# Patient Record
Sex: FEMALE | Race: WHITE | NOT HISPANIC OR LATINO | Employment: UNEMPLOYED | URBAN - METROPOLITAN AREA
[De-identification: names, ages, dates, MRNs, and addresses within clinical notes are randomized per-mention and may not be internally consistent; named-entity substitution may affect disease eponyms.]

---

## 2023-11-01 ENCOUNTER — APPOINTMENT (OUTPATIENT)
Dept: RADIOLOGY | Facility: HOSPITAL | Age: 33
End: 2023-11-01
Payer: COMMERCIAL

## 2023-11-01 ENCOUNTER — HOSPITAL ENCOUNTER (EMERGENCY)
Facility: HOSPITAL | Age: 33
Discharge: HOME | End: 2023-11-01
Payer: COMMERCIAL

## 2023-11-01 VITALS
SYSTOLIC BLOOD PRESSURE: 125 MMHG | DIASTOLIC BLOOD PRESSURE: 54 MMHG | RESPIRATION RATE: 17 BRPM | HEART RATE: 77 BPM | WEIGHT: 199.96 LBS | BODY MASS INDEX: 29.62 KG/M2 | TEMPERATURE: 98.4 F | HEIGHT: 69 IN

## 2023-11-01 DIAGNOSIS — M79.671 RIGHT FOOT PAIN: Primary | ICD-10-CM

## 2023-11-01 PROCEDURE — 73630 X-RAY EXAM OF FOOT: CPT | Mod: RT,FY,FR

## 2023-11-01 PROCEDURE — 73630 X-RAY EXAM OF FOOT: CPT | Mod: RIGHT SIDE | Performed by: RADIOLOGY

## 2023-11-01 PROCEDURE — 73610 X-RAY EXAM OF ANKLE: CPT | Mod: RT,FY

## 2023-11-01 PROCEDURE — 99284 EMERGENCY DEPT VISIT MOD MDM: CPT | Mod: 25

## 2023-11-01 PROCEDURE — 73610 X-RAY EXAM OF ANKLE: CPT | Mod: RIGHT SIDE | Performed by: RADIOLOGY

## 2023-11-01 ASSESSMENT — LIFESTYLE VARIABLES
REASON UNABLE TO ASSESS: NO
EVER FELT BAD OR GUILTY ABOUT YOUR DRINKING: NO
EVER HAD A DRINK FIRST THING IN THE MORNING TO STEADY YOUR NERVES TO GET RID OF A HANGOVER: NO
HAVE YOU EVER FELT YOU SHOULD CUT DOWN ON YOUR DRINKING: NO
HAVE PEOPLE ANNOYED YOU BY CRITICIZING YOUR DRINKING: NO

## 2023-11-01 ASSESSMENT — PAIN DESCRIPTION - LOCATION: LOCATION: FOOT

## 2023-11-01 ASSESSMENT — PAIN DESCRIPTION - PAIN TYPE: TYPE: ACUTE PAIN

## 2023-11-01 ASSESSMENT — PAIN SCALES - GENERAL: PAINLEVEL_OUTOF10: 7

## 2023-11-01 ASSESSMENT — COLUMBIA-SUICIDE SEVERITY RATING SCALE - C-SSRS
6. HAVE YOU EVER DONE ANYTHING, STARTED TO DO ANYTHING, OR PREPARED TO DO ANYTHING TO END YOUR LIFE?: NO
1. IN THE PAST MONTH, HAVE YOU WISHED YOU WERE DEAD OR WISHED YOU COULD GO TO SLEEP AND NOT WAKE UP?: NO
2. HAVE YOU ACTUALLY HAD ANY THOUGHTS OF KILLING YOURSELF?: NO

## 2023-11-01 ASSESSMENT — PAIN - FUNCTIONAL ASSESSMENT: PAIN_FUNCTIONAL_ASSESSMENT: 0-10

## 2023-11-01 ASSESSMENT — PAIN DESCRIPTION - ORIENTATION: ORIENTATION: RIGHT

## 2023-11-01 NOTE — ED PROVIDER NOTES
HPI   Chief Complaint   Patient presents with    Fall     Pt tripped on a rock and fell hurting her right foot       This is a 33-year-old female coming for right foot injury.  See MDM      History provided by:  Patient                      Mehnaz Coma Scale Score: 15                  Patient History   Past Medical History:   Diagnosis Date    Other specified health status 2021    No pertinent past medical history    Personal history of other endocrine, nutritional and metabolic disease 2017    History of hypothyroidism     Past Surgical History:   Procedure Laterality Date     SECTION, CLASSIC  2021     Section     No family history on file.  Social History     Tobacco Use    Smoking status: Never     Passive exposure: Never    Smokeless tobacco: Never   Substance Use Topics    Alcohol use: Yes    Drug use: Never       Physical Exam   ED Triage Vitals   Temp Heart Rate Resp BP   23 1737 23 1737 23 1737 23   37.1 °C (98.8 °F) 80 18 143/83      SpO2 Temp Source Heart Rate Source Patient Position   -- 23 1737 23 1737 23    Temporal Monitor Sitting      BP Location FiO2 (%)     23 1737 23     Left arm 96 %       Physical Exam  Vitals and nursing note reviewed.   Constitutional:       General: She is not in acute distress.     Appearance: Normal appearance. She is not ill-appearing or toxic-appearing.   HENT:      Head: Normocephalic and atraumatic.   Eyes:      Extraocular Movements: Extraocular movements intact.      Conjunctiva/sclera: Conjunctivae normal.      Pupils: Pupils are equal, round, and reactive to light.   Musculoskeletal:         General: Normal range of motion.      Cervical back: Normal range of motion and neck supple.   Feet:      Comments: There is tenderness to palpation to the lateral aspect of the right foot.  No swelling or ecchymosis.  Skin:     General: Skin is warm and dry.   Neurological:       General: No focal deficit present.      Mental Status: She is alert and oriented to person, place, and time.   Psychiatric:         Mood and Affect: Mood normal.         Behavior: Behavior normal.         Thought Content: Thought content normal.         ED Course & MDM   Diagnoses as of 11/01/23 1945   Right foot pain       Medical Decision Making  Summary:  Medical Decision Making:   Patient presented as described in HPI. Patient case including ROS, PE, and treatment and plan discussed with ED attending if attached as cosigner. Results from labs and or imaging included below if completed. Francia Villegas  is a 33 y.o. coming in for Patient presents with:  Fall: Pt tripped on a rock and fell hurting her right foot  .  Is a 33-year-old female coming in for right foot injury.  She states that she tripped on a rock injuring the right foot.  She felt a pop to the area and has had pain since.  She took some ibuprofen prior to arrival.  She states that has not helped much with her discomfort.  Patient has not fractured or injured this area before in the past.  On exam she does not have any swelling or ecchymosis to the right foot.  She has pain on palpation to the lateral aspect of the right foot.  Due to this imaging was completed of both the foot and the ankle to rule out avulsion injury or other abnormalities that could be present in the ankle as well.  Imaging shows no acute concerning abnormalities.  She was offered Toradol for further pain control however refused.  Patient will be given a walking boot and given orthopedic follow-up.    Patient was advised to follow up with PCP or recommended provider in 2-3 days for another evaluation and exam. I advised patient/guardian to return or go to closest emergency room immediately if symptoms change, get worse, new symptoms develop prior to follow up. If there is no improvement in symptoms in the next 24 hours they are advised to return for further evaluation and  exam. I also explained the plan and treatment course. Patient/guardian is in agreement with plan, treatment course, and follow up and states verbally that they will comply.    Labs Reviewed - No data to display   XR foot right 3+ views   Final Result    No acute bony abnormalities are identified in the right ankle or foot.    Signed by Moreno Farrar MD     XR ankle right 3+ views   Final Result    No acute bony abnormalities are identified in the right ankle or foot.    Signed by Moreno Farrar MD          Tests/Medications/Escalations of Care considered but not given: As in MDM    Patient care discussed with: N/A  Social Determinants affecting care: N/A    Final diagnosis and disposition as documented       Homegoing. I discussed the differential; results and discharge plan with the patient and/or family/friend/caregiver if present.  I emphasized the importance of follow-up with the physician I referred them to in the timeframe recommended.  I explained reasons for the patient to return to the Emergency Department. They agreed that if they feel their condition is worsening or if they have any other concern they should call 911 immediately for further assistance. I gave the patient an opportunity to ask all questions they had and answered all of them accordingly. They understand return precautions and discharge instructions. The patient and/or family/friend/caregiver expressed understanding verbally and that they would comply.     Disposition: Discharge      This note has been transcribed using voice recognition and may contain grammatical errors, misplaced words, incorrect words, incorrect phrases or other errors.          Procedure  Procedures     Rahat Ocampo PA-C  11/01/23 1947

## 2025-07-25 ENCOUNTER — HOSPITAL ENCOUNTER (EMERGENCY)
Facility: HOSPITAL | Age: 35
Discharge: HOME | End: 2025-07-25
Payer: COMMERCIAL

## 2025-07-25 ENCOUNTER — APPOINTMENT (OUTPATIENT)
Dept: RADIOLOGY | Facility: HOSPITAL | Age: 35
End: 2025-07-25
Payer: COMMERCIAL

## 2025-07-25 ENCOUNTER — PHARMACY VISIT (OUTPATIENT)
Dept: PHARMACY | Facility: CLINIC | Age: 35
End: 2025-07-25
Payer: MEDICARE

## 2025-07-25 VITALS
DIASTOLIC BLOOD PRESSURE: 78 MMHG | BODY MASS INDEX: 28.98 KG/M2 | SYSTOLIC BLOOD PRESSURE: 131 MMHG | OXYGEN SATURATION: 97 % | HEIGHT: 71 IN | RESPIRATION RATE: 20 BRPM | WEIGHT: 207 LBS | HEART RATE: 79 BPM | TEMPERATURE: 97.5 F

## 2025-07-25 DIAGNOSIS — S92.511A CLOSED DISPLACED FRACTURE OF PROXIMAL PHALANX OF LESSER TOE OF RIGHT FOOT, INITIAL ENCOUNTER: Primary | ICD-10-CM

## 2025-07-25 PROCEDURE — 99284 EMERGENCY DEPT VISIT MOD MDM: CPT

## 2025-07-25 PROCEDURE — 96372 THER/PROPH/DIAG INJ SC/IM: CPT

## 2025-07-25 PROCEDURE — 73660 X-RAY EXAM OF TOE(S): CPT | Mod: RIGHT SIDE | Performed by: RADIOLOGY

## 2025-07-25 PROCEDURE — 2500000004 HC RX 250 GENERAL PHARMACY W/ HCPCS (ALT 636 FOR OP/ED): Mod: JZ

## 2025-07-25 PROCEDURE — 73660 X-RAY EXAM OF TOE(S): CPT | Mod: RT

## 2025-07-25 PROCEDURE — RXMED WILLOW AMBULATORY MEDICATION CHARGE

## 2025-07-25 RX ORDER — OXYCODONE AND ACETAMINOPHEN 5; 325 MG/1; MG/1
1 TABLET ORAL EVERY 6 HOURS PRN
Qty: 5 TABLET | Refills: 0 | Status: SHIPPED | OUTPATIENT
Start: 2025-07-25 | End: 2025-07-28

## 2025-07-25 RX ORDER — KETOROLAC TROMETHAMINE 30 MG/ML
30 INJECTION, SOLUTION INTRAMUSCULAR; INTRAVENOUS ONCE
Status: COMPLETED | OUTPATIENT
Start: 2025-07-25 | End: 2025-07-25

## 2025-07-25 RX ADMIN — KETOROLAC TROMETHAMINE 30 MG: 30 INJECTION, SOLUTION INTRAMUSCULAR at 15:29

## 2025-07-25 ASSESSMENT — PAIN DESCRIPTION - ONSET: ONSET: ONGOING

## 2025-07-25 ASSESSMENT — PAIN DESCRIPTION - LOCATION: LOCATION: TOE (COMMENT WHICH ONE)

## 2025-07-25 ASSESSMENT — PAIN DESCRIPTION - ORIENTATION: ORIENTATION: RIGHT

## 2025-07-25 ASSESSMENT — PAIN DESCRIPTION - PAIN TYPE: TYPE: ACUTE PAIN

## 2025-07-25 ASSESSMENT — PAIN DESCRIPTION - FREQUENCY: FREQUENCY: CONSTANT/CONTINUOUS

## 2025-07-25 ASSESSMENT — PAIN DESCRIPTION - DESCRIPTORS: DESCRIPTORS: ACHING

## 2025-07-25 ASSESSMENT — PAIN SCALES - GENERAL: PAINLEVEL_OUTOF10: 10 - WORST POSSIBLE PAIN

## 2025-07-25 ASSESSMENT — PAIN - FUNCTIONAL ASSESSMENT: PAIN_FUNCTIONAL_ASSESSMENT: 0-10

## 2025-07-25 NOTE — ED PROVIDER NOTES
HPI   Chief Complaint   Patient presents with    Foot Injury       Patient is a 35-year-old female presenting to the ED for injury to her right foot times today.  Patient states she was barefoot in the grass when she tripped up her bike and bent her right fifth toe back.  Patient is unable to bear weight on the right foot.  Patient denies any numbness or tingling.  Patient has not had any pain medication for symptoms.  Patient denies any head injury.  Patient has no other complaints.              Patient History   Medical History[1]  Surgical History[2]  Family History[3]  Social History[4]    Physical Exam   ED Triage Vitals [07/25/25 1510]   Temperature Heart Rate Respirations BP   36.4 °C (97.5 °F) 79 20 131/78      Pulse Ox Temp Source Heart Rate Source Patient Position   97 % Temporal Monitor --      BP Location FiO2 (%)     -- --       Physical Exam    Cardiovascular:      Pulses: Normal pulses.   Pulmonary:      Effort: Pulmonary effort is normal.     Musculoskeletal:         General: Tenderness and deformity present.      Comments: Pain on palpation to the base of the right foot fifth digit with obvious deformity pain with attempt at ranging the toe     Skin:     Capillary Refill: Capillary refill takes less than 2 seconds.     Neurological:      Mental Status: She is alert.           ED Course & MDM   ED Course as of 07/25/25 1652 Fri Jul 25, 2025   1625 XR toe right 2+ views []      ED Course User Index  [] Jane Hanley PA-C         Diagnoses as of 07/25/25 1652   Closed displaced fracture of proximal phalanx of lesser toe of right foot, initial encounter                 No data recorded     Zearing Coma Scale Score: 15 (07/25/25 1511 : Justin Bhardwaj RN)                           Medical Decision Making  Medical Decision Making:  Patient presented as described in HPI. Patient case including ROS, PE, and treatment and plan discussed with ED attending if attached as cosigner. Due to patients  presentation orders completed include as documented.  Patient presents to the ED for right foot injury times today.  Patient has obvious deformity.  Patient is unable to range the fifth digit.  Patient given Toradol here.  Lidocaine was ordered to do a digital block.  Pending x-ray.  X-ray shows acute fracture at the fifth proximal phalanx.  Patient educated his findings.  Clyde tape placed and given crutches.  Educated follow-up with orthopedics patient follows mended soon.  Also ordered referral to the injury clinic.  Patient discharged home with pain medication educated any worsening symptoms to return patient remained stable on discharge.  Patient was advised to follow up with PCP or recommended provider in 2-3 days for another evaluation and exam. I advised patient/guardian to return or go to closest emergency room immediately if symptoms change, get worse, new symptoms develop prior to follow up. If there is no improvement in symptoms in the next 24 hours they are advised to return for further evaluation and exam. I also explained the plan and treatment course. Patient/guardian is in agreement with plan, treatment course, and follow up and states verbally that they will comply.        Patient care discussed with: N/A  Social Determinants affecting care: N/A    Final diagnosis and disposition as below.  See CI    Homegoing. I discussed the differential; results and discharge plan with the patient and/or family/friend/caregiver if present.  I emphasized the importance of follow-up with the physician I referred them to in the timeframe recommended.  I explained reasons for the patient to return to the Emergency Department. They agreed that if they feel their condition is worsening or if they have any other concern they should call 911 immediately for further assistance. I gave the patient an opportunity to ask all questions they had and answered all of them accordingly. They understand return precautions and  discharge instructions. The patient and/or family/friend/caregiver expressed understanding verbally and that they would comply.       Disposition:  Discharge        This note has been transcribed using voice recognition and may contain grammatical errors, misplaced words, incorrect words, incorrect phrases or other errors.        XR toe right 2+ views   Final Result   Acute fracture at the fifth proximal phalanx.   Signed by Ravinder Allen MD           Procedure  Procedures       [1]   Past Medical History:  Diagnosis Date    Other specified health status 2021    No pertinent past medical history    Personal history of other endocrine, nutritional and metabolic disease 2017    History of hypothyroidism   [2]   Past Surgical History:  Procedure Laterality Date     SECTION, CLASSIC  2021     Section   [3] No family history on file.  [4]   Social History  Tobacco Use    Smoking status: Never     Passive exposure: Never    Smokeless tobacco: Never   Substance Use Topics    Alcohol use: Yes    Drug use: Never        Jane Hanley PA-C  [phone]

## 2025-07-25 NOTE — ED TRIAGE NOTES
Patient was barefoot in her garage when she tripped over her kids bike and bent her right 5th toe back, patient has a deformity to the toe and is now unable to bear weight on her right foot.

## 2025-07-29 ENCOUNTER — APPOINTMENT (OUTPATIENT)
Dept: PRIMARY CARE | Facility: CLINIC | Age: 35
End: 2025-07-29

## 2025-07-29 VITALS
BODY MASS INDEX: 31.36 KG/M2 | OXYGEN SATURATION: 96 % | DIASTOLIC BLOOD PRESSURE: 74 MMHG | HEART RATE: 79 BPM | SYSTOLIC BLOOD PRESSURE: 126 MMHG | WEIGHT: 224 LBS | HEIGHT: 71 IN

## 2025-07-29 DIAGNOSIS — M25.522 ARTHRALGIA OF BOTH ELBOWS: ICD-10-CM

## 2025-07-29 DIAGNOSIS — Z13.1 SCREENING FOR DIABETES MELLITUS: ICD-10-CM

## 2025-07-29 DIAGNOSIS — T14.8XXA BROKEN BONES: ICD-10-CM

## 2025-07-29 DIAGNOSIS — R14.0 BLOATING: ICD-10-CM

## 2025-07-29 DIAGNOSIS — F41.9 ANXIETY: ICD-10-CM

## 2025-07-29 DIAGNOSIS — E55.9 VITAMIN D DEFICIENCY: ICD-10-CM

## 2025-07-29 DIAGNOSIS — L67.9 ABNORMALITY OF HAIR GROWTH: ICD-10-CM

## 2025-07-29 DIAGNOSIS — R53.83 OTHER FATIGUE: ICD-10-CM

## 2025-07-29 DIAGNOSIS — M25.521 ARTHRALGIA OF BOTH ELBOWS: ICD-10-CM

## 2025-07-29 DIAGNOSIS — Z00.00 HEALTH CARE MAINTENANCE: Primary | ICD-10-CM

## 2025-07-29 RX ORDER — ESCITALOPRAM OXALATE 10 MG/1
10 TABLET ORAL DAILY
Qty: 30 TABLET | Refills: 5 | Status: SHIPPED | OUTPATIENT
Start: 2025-07-29 | End: 2026-01-25

## 2025-07-29 ASSESSMENT — ENCOUNTER SYMPTOMS
DIARRHEA: 0
SINUS PRESSURE: 0
FEVER: 0
CHEST TIGHTNESS: 0
CHILLS: 0
ABDOMINAL PAIN: 0
SINUS PAIN: 0
DYSURIA: 0
SHORTNESS OF BREATH: 0
NAUSEA: 0
CONSTIPATION: 0
SORE THROAT: 0

## 2025-07-29 ASSESSMENT — PATIENT HEALTH QUESTIONNAIRE - PHQ9
2. FEELING DOWN, DEPRESSED OR HOPELESS: NOT AT ALL
SUM OF ALL RESPONSES TO PHQ9 QUESTIONS 1 AND 2: 0
1. LITTLE INTEREST OR PLEASURE IN DOING THINGS: NOT AT ALL

## 2025-07-29 NOTE — PROGRESS NOTES
Subjective   Patient ID: Francia Villegas is a 35 y.o. female who presents for Annual Exam (Patient is present in office for a CPE. Has been having joint pain, multiple broken bones over the last 3 years , weight gain the last 3 years, abnormal hair growth, acne, anxiety, horrible periods. ) and New Patient Visit.    HPI   Pt is here for annual wellness.  Reports overall health is good. She has had multiple broken bones in the last three years. Some in ankles and feet. Does complain of joint pain.   -she has noticed some weight gain, abnormal growth, acne, and horrible periods.     Do you take any herbs or supplements that were not prescribed by a doctor? Yes multivitmain  Are you taking calcium supplements? no  Are you taking aspirin daily? no  Colonoscopy: N/A  Fasting blood work: ordered  Last eye exam: yearly  Last dental Exam: every six months  Exercise: walking daily  Mood:pleasant.   Sleep: horrible she struggles with sleep.   Diet:  healthy tried fasting   Occupation:  N/A  Do you have pain that bothers you in your daily life? not asked    1. Patient Counseling:  --Nutrition: Stressed importance of moderation in sodium/caffeine intake, saturated fat and cholesterol, caloric balance, sufficient intake of fresh fruits, vegetables, fiber, calcium, iron, and 1 mg of folate supplement per day (for females capable of pregnancy).  --Discussed the issue of estrogen replacement, calcium supplement, and the daily use of baby aspirin as appropriate per pt.  --Exercise: Stressed the importance of regular exercise.   --Substance Abuse: Discussed cessation/primary prevention of tobacco, alcohol, or other drug use; driving or other dangerous activities under the influence; availability of treatment for abuse.    --Sexuality: Discussed sexually transmitted diseases, partner selection, use of condoms, avoidance of unintended pregnancy  and contraceptive alternatives.   --Injury prevention: Discussed safety belts, safety  "helmets, smoke detector, smoking near bedding or upholstery.   --Dental health: Discussed importance of regular tooth brushing, flossing, and dental visits.  --Immunizations reviewed.  --Discussed benefits of colon cancer screening.  --After hours service discussed with patient  2 Discussed the patient's BMI.  The BMI is above average. The patient received Provided instructions on dietary changes  Provided instructions on exercise  Advised to Increase physical activity because they have an above normal BMI.  3 Follow up in one year    Review of Systems   Constitutional:  Negative for chills and fever.   HENT:  Negative for ear pain, sinus pressure, sinus pain and sore throat.    Respiratory:  Negative for chest tightness and shortness of breath.    Cardiovascular:  Negative for chest pain.   Gastrointestinal:  Negative for abdominal pain, constipation, diarrhea and nausea.   Genitourinary:  Negative for dysuria.   Skin:  Negative for rash.   Neurological:  Negative for syncope.       Objective   /74   Pulse 79   Ht 1.803 m (5' 11\")   Wt 102 kg (224 lb)   SpO2 96%   BMI 31.24 kg/m²     Physical Exam  Vitals reviewed.   Constitutional:       Appearance: Normal appearance.     Cardiovascular:      Rate and Rhythm: Normal rate and regular rhythm.      Pulses: Normal pulses.      Heart sounds: Normal heart sounds.   Pulmonary:      Effort: Pulmonary effort is normal.      Breath sounds: Normal breath sounds.     Neurological:      General: No focal deficit present.      Mental Status: She is alert and oriented to person, place, and time.     Psychiatric:         Mood and Affect: Mood normal.         Behavior: Behavior normal.         Assessment/Plan   Diagnoses and all orders for this visit:  Health care maintenance  -     Lipid panel; Future  Other fatigue  -     CBC and Auto Differential; Future  -     Comprehensive metabolic panel; Future  -     Tsh With Reflex To Free T4 If Abnormal; Future  -     T3, " free; Future  -     Rheumatoid factor; Future  -     Ferritin; Future  -     Folate; Future  -     C-Reactive Protein; Future  -     Sedimentation Rate; Future  -     ELENA with Reflex to BENITO; Future  -     Vitamin B12; Future  Abnormality of hair growth  -     Insulin, random; Future  Arthralgia of both elbows  Anxiety  Comments:  escitalopram (Lexapro) 10 mg tablet ordered  Orders:  -     escitalopram (Lexapro) 10 mg tablet; Take 1 tablet (10 mg) by mouth once daily.  Screening for diabetes mellitus  -     Hemoglobin A1c; Future  Vitamin D deficiency  -     Vitamin D 25-Hydroxy,Total (for eval of Vitamin D levels); Future  Broken bones  Comments:  Dexa scan ordered  Orders:  -     XR DEXA bone density; Future  Bloating  -     [facility] MISCELLANEOUS TEST (ROOM TEMPERATURE); Future  -     Celiac Panel; Future  Other orders  -     Follow Up In Primary Care - Established; Future  -     [facility] FOOD AND TREE NUT ALLERGY W/REFL COMPONENTS  -     Allergen Interpretation

## 2025-07-30 LAB
25(OH)D3+25(OH)D2 SERPL-MCNC: 33 NG/ML (ref 30–100)
ALBUMIN SERPL-MCNC: 4.9 G/DL (ref 3.6–5.1)
ALMOND IGE QN: <0.1 KU/L
ALMOND IGE RAST: 0
ALP SERPL-CCNC: 45 U/L (ref 31–125)
ALT SERPL-CCNC: 13 U/L (ref 6–29)
ANA SER QL IF: NEGATIVE
ANION GAP SERPL CALCULATED.4IONS-SCNC: 12 MMOL/L (CALC) (ref 7–17)
AST SERPL-CCNC: 14 U/L (ref 10–30)
BASOPHILS # BLD AUTO: 36 CELLS/UL (ref 0–200)
BASOPHILS NFR BLD AUTO: 0.4 %
BILIRUB SERPL-MCNC: 0.4 MG/DL (ref 0.2–1.2)
BRAZIL NUT IGE QN: <0.1 KU/L
BRAZIL NUT IGE RAST: 0
BUN SERPL-MCNC: 13 MG/DL (ref 7–25)
CALCIUM SERPL-MCNC: 9.6 MG/DL (ref 8.6–10.2)
CASHEW NUT IGE QN: <0.1 KU/L
CASHEW NUT IGE RAST: 0
CHLORIDE SERPL-SCNC: 104 MMOL/L (ref 98–110)
CHOLEST SERPL-MCNC: 171 MG/DL
CHOLEST/HDLC SERPL: 4.4 (CALC)
CO2 SERPL-SCNC: 23 MMOL/L (ref 20–32)
CODFISH IGE QN: <0.1 KU/L
CODFISH IGE RAST: 0
CREAT SERPL-MCNC: 0.72 MG/DL (ref 0.5–0.97)
CRP SERPL-MCNC: <3 MG/L
EGFRCR SERPLBLD CKD-EPI 2021: 112 ML/MIN/1.73M2
EGG WHITE IGE QN: <0.1 KU/L
EGG WHITE IGE RAST: 0
EOSINOPHIL # BLD AUTO: 63 CELLS/UL (ref 15–500)
EOSINOPHIL NFR BLD AUTO: 0.7 %
ERYTHROCYTE [DISTWIDTH] IN BLOOD BY AUTOMATED COUNT: 13.3 % (ref 11–15)
ERYTHROCYTE [SEDIMENTATION RATE] IN BLOOD BY WESTERGREN METHOD: 6 MM/H
EST. AVERAGE GLUCOSE BLD GHB EST-MCNC: 114 MG/DL
EST. AVERAGE GLUCOSE BLD GHB EST-SCNC: 6.3 MMOL/L
FERRITIN SERPL-MCNC: 52 NG/ML (ref 16–154)
FOLATE SERPL-MCNC: 10.7 NG/ML
GLIADIN IGA SER IA-ACNC: 1.8 U/ML
GLIADIN IGG SER IA-ACNC: 8.7 U/ML
GLUCOSE SERPL-MCNC: 85 MG/DL (ref 65–139)
HAZELNUT IGE QN: <0.1 KU/L
HAZELNUT IGE RAST: 0
HBA1C MFR BLD: 5.6 %
HCT VFR BLD AUTO: 41.6 % (ref 35–45)
HDLC SERPL-MCNC: 39 MG/DL
HGB BLD-MCNC: 13.4 G/DL (ref 11.7–15.5)
IGA SERPL-MCNC: 171 MG/DL (ref 47–310)
LDLC SERPL CALC-MCNC: 101 MG/DL (CALC)
LYMPHOCYTES # BLD AUTO: 1944 CELLS/UL (ref 850–3900)
LYMPHOCYTES NFR BLD AUTO: 21.6 %
MACADAMIA IGE QN: <0.1 KU/L
MACADAMIA IGE RAST: 0
MCH RBC QN AUTO: 29.8 PG (ref 27–33)
MCHC RBC AUTO-ENTMCNC: 32.2 G/DL (ref 32–36)
MCV RBC AUTO: 92.4 FL (ref 80–100)
MILK IGE QN: <0.1 KU/L
MILK IGE RAST: 0
MONOCYTES # BLD AUTO: 468 CELLS/UL (ref 200–950)
MONOCYTES NFR BLD AUTO: 5.2 %
NEUTROPHILS # BLD AUTO: 6489 CELLS/UL (ref 1500–7800)
NEUTROPHILS NFR BLD AUTO: 72.1 %
NONHDLC SERPL-MCNC: 132 MG/DL (CALC)
PEANUT IGE QN: <0.1 KU/L
PEANUT IGE RAST: 0
PLATELET # BLD AUTO: 317 THOUSAND/UL (ref 140–400)
PMV BLD REES-ECKER: 11.7 FL (ref 7.5–12.5)
POTASSIUM SERPL-SCNC: 4.5 MMOL/L (ref 3.5–5.3)
PROT SERPL-MCNC: 7.4 G/DL (ref 6.1–8.1)
RBC # BLD AUTO: 4.5 MILLION/UL (ref 3.8–5.1)
REF LAB TEST REF RANGE: NORMAL
RHEUMATOID FACT SERPL-ACNC: <10 IU/ML
SALMON IGE QN: <0.1 KU/L
SALMON IGE RAST: 0
SCALLOP IGE QN: <0.1 KU/L
SCALLOP IGE RAST: 0
SESAME SEED IGE QN: <0.1 KU/L
SESAME SEED IGE RAST: 0
SHRIMP IGE QN: <0.1 KU/L
SHRIMP IGE RAST: 0
SODIUM SERPL-SCNC: 139 MMOL/L (ref 135–146)
SOYBEAN IGE QN: <0.1 KU/L
SOYBEAN IGE RAST: 0
T3FREE SERPL-MCNC: 2.8 PG/ML (ref 2.3–4.2)
TRIGL SERPL-MCNC: 195 MG/DL
TSH SERPL-ACNC: 2.17 MIU/L
TTG IGA SER-ACNC: <1 U/ML
TTG IGG SER-ACNC: <1 U/ML
TUNA IGE QN: <0.1 KU/L
TUNA IGE RAST: 0
VIT B12 SERPL-MCNC: 236 PG/ML (ref 200–1100)
WALNUT IGE QN: <0.1 KU/L
WALNUT IGE RAST: 0
WBC # BLD AUTO: 9 THOUSAND/UL (ref 3.8–10.8)
WHEAT IGE QN: <0.1 KU/L
WHEAT IGE RAST: 0

## 2025-08-06 ENCOUNTER — HOSPITAL ENCOUNTER (OUTPATIENT)
Dept: RADIOLOGY | Facility: HOSPITAL | Age: 35
Discharge: HOME | End: 2025-08-06
Payer: COMMERCIAL

## 2025-08-06 DIAGNOSIS — T14.8XXA BROKEN BONES: ICD-10-CM

## 2025-08-06 PROCEDURE — 77080 DXA BONE DENSITY AXIAL: CPT | Performed by: RADIOLOGY

## 2025-08-06 PROCEDURE — 77080 DXA BONE DENSITY AXIAL: CPT

## 2025-08-09 PROBLEM — Z13.1 SCREENING FOR DIABETES MELLITUS: Status: ACTIVE | Noted: 2025-07-29

## 2025-08-09 PROBLEM — T14.8XXA BROKEN BONES: Status: ACTIVE | Noted: 2025-08-09

## 2025-08-09 PROBLEM — F41.9 ANXIETY: Status: ACTIVE | Noted: 2025-08-09

## 2025-08-09 PROBLEM — E55.9 VITAMIN D DEFICIENCY: Status: ACTIVE | Noted: 2025-08-09

## 2025-08-09 PROBLEM — R14.0 BLOATING: Status: ACTIVE | Noted: 2025-08-09

## 2025-08-26 ENCOUNTER — TELEPHONE (OUTPATIENT)
Dept: PRIMARY CARE | Facility: CLINIC | Age: 35
End: 2025-08-26
Payer: COMMERCIAL

## 2025-09-29 ENCOUNTER — APPOINTMENT (OUTPATIENT)
Dept: PRIMARY CARE | Facility: CLINIC | Age: 35
End: 2025-09-29
Payer: COMMERCIAL